# Patient Record
Sex: FEMALE | Race: BLACK OR AFRICAN AMERICAN | NOT HISPANIC OR LATINO | URBAN - METROPOLITAN AREA
[De-identification: names, ages, dates, MRNs, and addresses within clinical notes are randomized per-mention and may not be internally consistent; named-entity substitution may affect disease eponyms.]

---

## 2022-09-02 ENCOUNTER — OFFICE VISIT (OUTPATIENT)
Dept: URBAN - METROPOLITAN AREA CLINIC 92 | Facility: CLINIC | Age: 37
End: 2022-09-02

## 2022-09-02 RX ORDER — OMEPRAZOLE 40 MG/1
TAKE 1 CAPSULE (40 MG) BY ORAL ROUTE ONCE DAILY BEFORE A MEAL FOR 30 DAYS CAPSULE, DELAYED RELEASE PELLETS ORAL 1
Qty: 30 | Refills: 2 | Status: ACTIVE | COMMUNITY
Start: 2017-09-25 | End: 1900-01-01

## 2022-09-02 NOTE — HPI-TODAY'S VISIT:
\37 year old female seen today for abdominal pain.   She was previously seen by Dr. Mcguire in 2017 due to similar issues and constipation. At that time she was inittially seen for epigastric abdoinal pain assicoated with anorexia and nasuea w/o vomiting. [place] [facility] [place]. She had a EGD on 9/17/17 that showed mild crhonic gastritis w/o h pylori.  it was recommened she stay on Omerprazole 40mg daily then trial off.

## 2022-10-06 ENCOUNTER — OFFICE VISIT (OUTPATIENT)
Dept: URBAN - METROPOLITAN AREA CLINIC 118 | Facility: CLINIC | Age: 37
End: 2022-10-06

## 2022-10-06 RX ORDER — OMEPRAZOLE 40 MG/1
TAKE 1 CAPSULE (40 MG) BY ORAL ROUTE ONCE DAILY BEFORE A MEAL FOR 30 DAYS CAPSULE, DELAYED RELEASE PELLETS ORAL 1
Qty: 30 | Refills: 2 | Status: ACTIVE | COMMUNITY
Start: 2017-09-25 | End: 1900-01-01

## 2022-10-25 ENCOUNTER — DASHBOARD ENCOUNTERS (OUTPATIENT)
Age: 37
End: 2022-10-25

## 2022-10-25 ENCOUNTER — OFFICE VISIT (OUTPATIENT)
Dept: URBAN - METROPOLITAN AREA CLINIC 118 | Facility: CLINIC | Age: 37
End: 2022-10-25
Payer: COMMERCIAL

## 2022-10-25 ENCOUNTER — LAB OUTSIDE AN ENCOUNTER (OUTPATIENT)
Dept: URBAN - METROPOLITAN AREA CLINIC 118 | Facility: CLINIC | Age: 37
End: 2022-10-25

## 2022-10-25 VITALS
HEIGHT: 63 IN | WEIGHT: 159.2 LBS | HEART RATE: 107 BPM | BODY MASS INDEX: 28.21 KG/M2 | DIASTOLIC BLOOD PRESSURE: 86 MMHG | SYSTOLIC BLOOD PRESSURE: 127 MMHG | TEMPERATURE: 98.4 F

## 2022-10-25 DIAGNOSIS — R19.4 CHANGE IN BOWEL HABIT: ICD-10-CM

## 2022-10-25 DIAGNOSIS — K82.9 GALLBLADDER DISEASE: ICD-10-CM

## 2022-10-25 PROCEDURE — 99204 OFFICE O/P NEW MOD 45 MIN: CPT | Performed by: INTERNAL MEDICINE

## 2022-10-25 RX ORDER — OMEPRAZOLE 40 MG/1
TAKE 1 CAPSULE (40 MG) BY ORAL ROUTE ONCE DAILY BEFORE A MEAL FOR 30 DAYS CAPSULE, DELAYED RELEASE PELLETS ORAL 1
Qty: 30 | Refills: 2 | Status: ACTIVE | COMMUNITY
Start: 2017-09-25 | End: 1900-01-01

## 2022-10-25 NOTE — HPI-TODAY'S VISIT:
pt presents for GI evaluation. pt reprots 2-3 months of right upper abdominal pain intermittently with eating especially spicy foods. Pt notes nausea without vomiting. Pt no gerd or other UGI symptoms. Pt denies nsaids/asa. No recent weight loss or anemia. Pt notes decrease bm recently but agrees poor fiber diet. No rectal bleeding  or other LGI symptoms. Pt went to ED, ultrasound showed possible GB inflammation, referred to GI.

## 2023-05-30 ENCOUNTER — OFFICE VISIT (OUTPATIENT)
Dept: URBAN - METROPOLITAN AREA CLINIC 118 | Facility: CLINIC | Age: 38
End: 2023-05-30
